# Patient Record
Sex: MALE | Race: WHITE
[De-identification: names, ages, dates, MRNs, and addresses within clinical notes are randomized per-mention and may not be internally consistent; named-entity substitution may affect disease eponyms.]

---

## 2021-01-28 ENCOUNTER — HOSPITAL ENCOUNTER (EMERGENCY)
Dept: HOSPITAL 12 - ER | Age: 46
Discharge: HOME | End: 2021-01-28
Payer: COMMERCIAL

## 2021-01-28 VITALS — DIASTOLIC BLOOD PRESSURE: 90 MMHG | SYSTOLIC BLOOD PRESSURE: 120 MMHG

## 2021-01-28 VITALS — WEIGHT: 315 LBS | HEIGHT: 69 IN | BODY MASS INDEX: 46.65 KG/M2

## 2021-01-28 DIAGNOSIS — M54.5: Primary | ICD-10-CM

## 2021-01-28 DIAGNOSIS — G89.4: ICD-10-CM

## 2021-01-28 DIAGNOSIS — M25.531: ICD-10-CM

## 2021-01-28 DIAGNOSIS — Z91.81: ICD-10-CM

## 2021-01-28 DIAGNOSIS — E66.9: ICD-10-CM

## 2021-01-28 DIAGNOSIS — Z59.0: ICD-10-CM

## 2021-01-28 PROCEDURE — A4663 DIALYSIS BLOOD PRESSURE CUFF: HCPCS

## 2021-01-28 SDOH — ECONOMIC STABILITY - HOUSING INSECURITY: HOMELESSNESS: Z59.0

## 2021-04-11 ENCOUNTER — HOSPITAL ENCOUNTER (EMERGENCY)
Dept: HOSPITAL 54 - ER | Age: 46
Discharge: LEFT BEFORE DISCHARGE | End: 2021-04-11
Payer: MEDICARE

## 2021-04-11 VITALS — WEIGHT: 250 LBS | HEIGHT: 70 IN | BODY MASS INDEX: 35.79 KG/M2

## 2021-04-11 VITALS — DIASTOLIC BLOOD PRESSURE: 89 MMHG | SYSTOLIC BLOOD PRESSURE: 158 MMHG
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
## 2021-04-11 DIAGNOSIS — Z59.0: ICD-10-CM
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
## 2021-04-11 DIAGNOSIS — Z91.040: ICD-10-CM

## 2021-04-11 DIAGNOSIS — S93.491A: Primary | ICD-10-CM

## 2021-04-11 DIAGNOSIS — Z88.0: ICD-10-CM
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
## 2021-04-11 SDOH — ECONOMIC STABILITY - HOUSING INSECURITY: HOMELESSNESS: Z59.0

## 2021-04-19 PROCEDURE — 99283 EMERGENCY DEPT VISIT LOW MDM: CPT

## 2021-04-20 ENCOUNTER — APPOINTMENT (OUTPATIENT)
Dept: RADIOLOGY | Facility: MEDICAL CENTER | Age: 46
End: 2021-04-20
Attending: EMERGENCY MEDICINE
Payer: MEDICARE

## 2021-04-20 ENCOUNTER — HOSPITAL ENCOUNTER (EMERGENCY)
Facility: MEDICAL CENTER | Age: 46
End: 2021-04-20
Attending: EMERGENCY MEDICINE
Payer: MEDICARE

## 2021-04-20 VITALS
TEMPERATURE: 98.4 F | HEIGHT: 69 IN | SYSTOLIC BLOOD PRESSURE: 141 MMHG | HEART RATE: 110 BPM | DIASTOLIC BLOOD PRESSURE: 87 MMHG | RESPIRATION RATE: 20 BRPM | WEIGHT: 310 LBS | OXYGEN SATURATION: 91 % | BODY MASS INDEX: 45.91 KG/M2

## 2021-04-20 DIAGNOSIS — M25.562 ACUTE PAIN OF LEFT KNEE: ICD-10-CM

## 2021-04-20 DIAGNOSIS — M25.572 ACUTE LEFT ANKLE PAIN: ICD-10-CM

## 2021-04-20 DIAGNOSIS — M25.532 LEFT WRIST PAIN: ICD-10-CM

## 2021-04-20 PROCEDURE — 73110 X-RAY EXAM OF WRIST: CPT | Mod: LT

## 2021-04-20 PROCEDURE — 73562 X-RAY EXAM OF KNEE 3: CPT | Mod: LT

## 2021-04-20 PROCEDURE — 99283 EMERGENCY DEPT VISIT LOW MDM: CPT

## 2021-04-20 PROCEDURE — 73610 X-RAY EXAM OF ANKLE: CPT | Mod: LT

## 2021-04-20 RX ORDER — ACETAMINOPHEN 325 MG/1
650 TABLET ORAL ONCE
Status: DISCONTINUED | OUTPATIENT
Start: 2021-04-20 | End: 2021-04-20 | Stop reason: HOSPADM

## 2021-04-20 RX ORDER — IBUPROFEN 600 MG/1
600 TABLET ORAL ONCE
Status: DISCONTINUED | OUTPATIENT
Start: 2021-04-20 | End: 2021-04-20 | Stop reason: HOSPADM

## 2021-04-20 NOTE — DISCHARGE INSTRUCTIONS
You were seen in the ER for left ankle pain, left knee pain, and left wrist pain, after an accidental fall while trying to board a city bus.  Thankfully her x-rays did not reveal an acute abnormality.  You are safe for discharge.  I recommend Tylenol and Motrin as well as rest, ice, compression, elevation.  Follow-up with your primary care physician for recheck and return to the ER with new or worsening symptoms.  Good luck, I hope you feel better soon!

## 2021-04-20 NOTE — ED TRIAGE NOTES
Chief Complaint   Patient presents with   • Ankle Pain     pt states he was getting off bus when he fell states his left ankle and right wrist pain   • Wrist Pain     no noted swelling or redness   • Frostbite     pt states he got frostbite 2 years ago. both feet noted to be black, cms intact.        Pt in wheelchair to triage. Pt alert and oriented. Pt educated on triage process and to update staff if any changes. Pt placed back into lobby.

## 2021-04-20 NOTE — ED NOTES
Pt to rm in WC. Amb to [facility] with steady gait. Pt refused gown and spO2 monitoring. Pt requesting juice and water, educated on NPO status until after ERP eval. Pt amb around rm with steady gait while waiting for ERP.

## 2021-04-20 NOTE — ED PROVIDER NOTES
"ED Provider Note    CHIEF COMPLAINT  Chief Complaint   Patient presents with   • Ankle Pain     pt states he was getting off bus when he fell states his left ankle and right wrist pain   • Wrist Pain     no noted swelling or redness   • Frostbite     pt states he got frostbite 2 years ago. both feet noted to be black, cms intact.        HPI  Jason Barr is a 45 y.o. male who presents with a chief complaint of left ankle and left wrist pain.  He notes that he tripped while trying to get onto the bus and landed on his left knee.  He was able to call 911 and was brought to the ER by ambulance.  He did not hit his head or lose consciousness.  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  He is requesting apple juice, ice water, and \"a lot of food \".  Of note, nursing staff triage chief complaint states the patient is complaining of right wrist pain but the patient states it is his left wrist that is hurting him.    REVIEW OF SYSTEMS  See HPI for further details.  Left ankle pain.  Left wrist pain.  Left knee pain.  All other systems are negative.     PAST MEDICAL HISTORY       SOCIAL HISTORY  Social History     Tobacco Use   • Smoking status: Current Some Day Smoker     Types: Cigarettes   • Smokeless tobacco: Never Used   Substance and Sexual Activity   • Alcohol use: Not Currently   • Drug use: Never   • Sexual activity: Not on file       SURGICAL HISTORY  patient denies any surgical history    CURRENT MEDICATIONS  Home Medications    **Home medications have not yet been reviewed for this encounter**         ALLERGIES  Allergies   Allergen Reactions   • Penicillins      hives       PHYSICAL EXAM  VITAL SIGNS: /92   Pulse (!) 110   Temp 36.9 °C (98.4 °F) (Temporal)   Resp 20   Ht 1.753 m (5' 9\")   Wt (!) 141 kg (310 lb)   SpO2 91%   BMI 45.78 kg/m²    Pulse ox interpretation: I interpret this " pulse ox as normal.  Constitutional: Alert in no apparent distress.  HENT: No signs of trauma, Bilateral external ears normal, Nose normal.  Moist mucous membranes.  Eyes: Pupils are equal and reactive, Conjunctiva normal, Non-icteric.   Neck: Normal range of motion, No tenderness, Supple, No stridor.   Lymphatic: No lymphadenopathy noted.   Cardiovascular: Warm and well perfused.  2+ left radial and dorsalis pedis pulses.  Thorax & Lungs: No respiratory distress.   Skin: Warm, Dry, No erythema.  Black dirt ground into the skin of bilateral feet.  Back: Normal alignment.  Extremities: Intact distal pulses, mild bilateral lower extremity edema, tenderness over bilateral malleoli of the left ankle, tender over left patella, tender over entire left wrist without snuffbox tenderness, No cyanosis.  Musculoskeletal: Good range of motion in all major joints. No major deformities noted.   Neurologic: Alert, Normal motor function, Normal sensory function, No focal deficits noted.   Psychiatric: Affect normal, Judgment normal, Mood normal.     DIAGNOSTIC STUDIES / PROCEDURES    RADIOLOGY  Left wrist x-ray  Left knee x-ray  Left ankle x-ray    COURSE & MEDICAL DECISION MAKING  Pertinent Labs & Imaging studies reviewed. (See chart for details)  This is a 45-year-old undomiciled male who is here with left wrist, knee, and ankle pain after falling while tripping getting onto a bus.  He did not hit his head or lose consciousness.  He has been ambulatory since the injury and was noted ambulating in his room without difficulty prior to my exam.  He has some mild left wrist, left knee, and left ankle tenderness without any obvious deformity.  His bilateral feet and distal legs are black with grime but warm and well-perfused.  They do not appear necrotic.  He is requesting food, ice water, and juice.    Patient has declined Tylenol and Motrin.  X-rays are without acute abnormality.  He is safe for discharge with close outpatient  management. Given outpatient resources for the local shelter. Discharged in good and stable condition. Ambulating without difficulty.    The patient will return for worsening symptoms and is stable at the time of discharge. The patient verbalizes understanding and will comply.    FINAL IMPRESSION  1. Left wrist pain     2. Acute left ankle pain     3. Acute pain of left knee           Electronically signed by: Maximo Castelan M.D., 4/20/2021 1:57 AM

## 2021-04-20 NOTE — ED NOTES
"Pt refused medications, stating, \"I can't, they knock me out.\" Hot meal provided per pt request.  "

## 2021-04-20 NOTE — ED NOTES
Pt refused d/c vitals. Pt given homeless shelter resources. Pt d/c from ED a/o x 4 GCS 15, ambulatory without assistance with steady gait. Pt given d/c instructions and verbalized understanding.